# Patient Record
Sex: MALE | Race: WHITE | Employment: UNEMPLOYED | URBAN - METROPOLITAN AREA
[De-identification: names, ages, dates, MRNs, and addresses within clinical notes are randomized per-mention and may not be internally consistent; named-entity substitution may affect disease eponyms.]

---

## 2024-08-19 ENCOUNTER — HOSPITAL ENCOUNTER (EMERGENCY)
Age: 84
Discharge: HOME | End: 2024-08-19
Attending: EMERGENCY MEDICINE
Payer: MEDICARE

## 2024-08-19 ENCOUNTER — HOSPITAL ENCOUNTER (EMERGENCY)
Dept: VASCULAR LAB | Age: 84
Discharge: HOME | End: 2024-08-21
Payer: MEDICARE

## 2024-08-19 ENCOUNTER — APPOINTMENT (OUTPATIENT)
Dept: GENERAL RADIOLOGY | Age: 84
End: 2024-08-19
Payer: MEDICARE

## 2024-08-19 VITALS
OXYGEN SATURATION: 98 % | DIASTOLIC BLOOD PRESSURE: 65 MMHG | HEART RATE: 66 BPM | SYSTOLIC BLOOD PRESSURE: 107 MMHG | RESPIRATION RATE: 18 BRPM | TEMPERATURE: 97.9 F

## 2024-08-19 DIAGNOSIS — M79.604 RIGHT LEG PAIN: Primary | ICD-10-CM

## 2024-08-19 LAB
ALBUMIN SERPL-MCNC: 4.3 G/DL (ref 3.4–5)
ALBUMIN/GLOB SERPL: 1.2 {RATIO} (ref 1.1–2.2)
ALP SERPL-CCNC: 109 U/L (ref 40–129)
ALT SERPL-CCNC: 52 U/L (ref 10–40)
ANION GAP SERPL CALCULATED.3IONS-SCNC: 11 MMOL/L (ref 3–16)
AST SERPL-CCNC: 37 U/L (ref 15–37)
BASOPHILS # BLD: 0.1 K/UL (ref 0–0.2)
BASOPHILS NFR BLD: 0.7 %
BILIRUB SERPL-MCNC: 0.7 MG/DL (ref 0–1)
BUN SERPL-MCNC: 49 MG/DL (ref 7–20)
CALCIUM SERPL-MCNC: 9.4 MG/DL (ref 8.3–10.6)
CHLORIDE SERPL-SCNC: 102 MMOL/L (ref 99–110)
CO2 SERPL-SCNC: 23 MMOL/L (ref 21–32)
CREAT SERPL-MCNC: 1 MG/DL (ref 0.8–1.3)
DEPRECATED RDW RBC AUTO: 13.8 % (ref 12.4–15.4)
EOSINOPHIL # BLD: 0.9 K/UL (ref 0–0.6)
EOSINOPHIL NFR BLD: 11.3 %
GFR SERPLBLD CREATININE-BSD FMLA CKD-EPI: 75 ML/MIN/{1.73_M2}
GLUCOSE SERPL-MCNC: 130 MG/DL (ref 70–99)
HCT VFR BLD AUTO: 41.2 % (ref 40.5–52.5)
HGB BLD-MCNC: 13.9 G/DL (ref 13.5–17.5)
INR PPP: 1.08 (ref 0.85–1.15)
LYMPHOCYTES # BLD: 0.9 K/UL (ref 1–5.1)
LYMPHOCYTES NFR BLD: 11.1 %
MCH RBC QN AUTO: 31.7 PG (ref 26–34)
MCHC RBC AUTO-ENTMCNC: 33.7 G/DL (ref 31–36)
MCV RBC AUTO: 94 FL (ref 80–100)
MONOCYTES # BLD: 0.8 K/UL (ref 0–1.3)
MONOCYTES NFR BLD: 9.8 %
NEUTROPHILS # BLD: 5.2 K/UL (ref 1.7–7.7)
NEUTROPHILS NFR BLD: 67.1 %
PLATELET # BLD AUTO: 247 K/UL (ref 135–450)
PMV BLD AUTO: 7.8 FL (ref 5–10.5)
POTASSIUM SERPL-SCNC: 4.6 MMOL/L (ref 3.5–5.1)
PROT SERPL-MCNC: 8 G/DL (ref 6.4–8.2)
PROTHROMBIN TIME: 14.2 SEC (ref 11.9–14.9)
RBC # BLD AUTO: 4.39 M/UL (ref 4.2–5.9)
SODIUM SERPL-SCNC: 136 MMOL/L (ref 136–145)
VAS RIGHT POP RFX: 1.1 S
WBC # BLD AUTO: 7.7 K/UL (ref 4–11)

## 2024-08-19 PROCEDURE — 99284 EMERGENCY DEPT VISIT MOD MDM: CPT

## 2024-08-19 PROCEDURE — 80053 COMPREHEN METABOLIC PANEL: CPT

## 2024-08-19 PROCEDURE — 85610 PROTHROMBIN TIME: CPT

## 2024-08-19 PROCEDURE — 93971 EXTREMITY STUDY: CPT | Performed by: SURGERY

## 2024-08-19 PROCEDURE — 85025 COMPLETE CBC W/AUTO DIFF WBC: CPT

## 2024-08-19 PROCEDURE — 73502 X-RAY EXAM HIP UNI 2-3 VIEWS: CPT

## 2024-08-19 PROCEDURE — 93971 EXTREMITY STUDY: CPT

## 2024-08-19 RX ORDER — IBUPROFEN 600 MG/1
600 TABLET ORAL 3 TIMES DAILY PRN
Qty: 21 TABLET | Refills: 0 | Status: SHIPPED | OUTPATIENT
Start: 2024-08-19 | End: 2024-08-26

## 2024-08-19 ASSESSMENT — PAIN DESCRIPTION - ORIENTATION: ORIENTATION: RIGHT

## 2024-08-19 ASSESSMENT — PAIN DESCRIPTION - LOCATION: LOCATION: LEG

## 2024-08-19 ASSESSMENT — PAIN SCALES - GENERAL: PAINLEVEL_OUTOF10: 10

## 2024-08-19 ASSESSMENT — PAIN - FUNCTIONAL ASSESSMENT: PAIN_FUNCTIONAL_ASSESSMENT: 0-10

## 2024-08-19 ASSESSMENT — PAIN DESCRIPTION - DESCRIPTORS: DESCRIPTORS: ACHING

## 2024-08-19 NOTE — ED NOTES
Pt daughter called and spoke to RN, Pt's daughter with permission from patient was updated on patients plan of care.

## 2024-08-20 NOTE — ED PROVIDER NOTES
Emergency Department Attending Provider Note  Location: [facility]  ED  8/19/2024     Patient Identification  Terrence Urena is a 83 y.o. male      Terrence Urena was evaluated in the Emergency Department for RLE/ R hip/thigh pain x 2 weeks.   Hx CP, no Trauma    HPI: as noted above    Physical Exam: NL VS, NL mental status, able to R range R hip and knee with spasticity, no TTP or skin changes        Patient seen and evaluated.  Relevant records reviewed.  Patient presents with symptoms noted above. Labs, XR, US unrevealing  Will plan for supportive Tx, PCP f/u, return precautions     Although initial history and physical exam information was obtained by SANDEE/NPP/MD/DO (who also dictated a record of this visit), I personally saw the patient and made/approved the management plan and take responsibility for patient management. I, Dr. Pardo, am the primary clinician of record.   This chart was generated in part by using Dragon Dictation system and may contain errors related to that system including errors in grammar, punctuation, and spelling, as well as words and phrases that may be inappropriate. If there are any questions or concerns please feel free to contact the dictating provider for clarification.     Tr Pardo MD   [facility]       Tr Pardo MD  08/19/24 0239    
Value    Lymphocytes Absolute 0.9 (*)     Eosinophils Absolute 0.9 (*)     All other components within normal limits   COMPREHENSIVE METABOLIC PANEL W/ REFLEX TO MG FOR LOW K - Abnormal; Notable for the following components:    Glucose 130 (*)     BUN 49 (*)     ALT 52 (*)     All other components within normal limits   PROTIME-INR     When ordered, only abnormal lab results are displayed.  All other labs were within normal range or not returned as of this dictation.     RADIOLOGY  Non-plain film images such as CT, U/S, and MRI are read by the radiologist.  Plain radiographic images are visualized and preliminarily interpreted by the ED Provider with the below findings:     No acute abnormalities.    Interpretation per the Radiologist below, if available at the time of this note:  Vascular duplex lower extremity venous right   Final Result      XR HIP RIGHT (2-3 VIEWS)   Final Result   No acute abnormality of the hip.           PROCEDURES  Unless otherwise noted below, none.    ED COURSE/DDx/MDM  Vitals:  Vitals:    08/19/24 1148 08/19/24 1154 08/19/24 1343 08/19/24 1456   BP: 134/72 134/72 (!) 91/59 107/65   Pulse: 65 65 66 66   Resp: 16  18 18   Temp: 97.9 °F (36.6 °C)      TempSrc: Oral      SpO2: 99% 100% 98% 98%     Patient received following medications in ED:  Medications - No data to display    Chronic conditions affecting care:    has a past medical history of Hypertension and Muscular dystrophy (HCC).      Reassessment:   ED Course as of 08/19/24 2136   Mon Aug 19, 2024   1202 Patient seen and evaluated in no acute distress.  Patient's vital signs reviewed and are within normal limits.  Patient's physical exam is largely unremarkable.  Will order labs, x-ray, and ultrasound to further evaluate. [JM]   1522 Patient reevaluated.  Patient continues to remain stable here in the ED.  Patient's ultrasound of the right lower extremity is negative for DVT.  Patient's x-ray of the right hip is unremarkable.  The